# Patient Record
Sex: FEMALE | Race: ASIAN | NOT HISPANIC OR LATINO | URBAN - METROPOLITAN AREA
[De-identification: names, ages, dates, MRNs, and addresses within clinical notes are randomized per-mention and may not be internally consistent; named-entity substitution may affect disease eponyms.]

---

## 2020-03-04 ENCOUNTER — EMERGENCY (EMERGENCY)
Facility: HOSPITAL | Age: 65
LOS: 1 days | Discharge: HOME | End: 2020-03-04
Attending: EMERGENCY MEDICINE
Payer: COMMERCIAL

## 2020-03-04 ENCOUNTER — APPOINTMENT (OUTPATIENT)
Dept: PLASTIC SURGERY | Facility: CLINIC | Age: 65
End: 2020-03-04
Payer: COMMERCIAL

## 2020-03-04 VITALS
TEMPERATURE: 98 F | HEART RATE: 83 BPM | WEIGHT: 125 LBS | RESPIRATION RATE: 18 BRPM | OXYGEN SATURATION: 97 % | DIASTOLIC BLOOD PRESSURE: 92 MMHG | HEIGHT: 63 IN | SYSTOLIC BLOOD PRESSURE: 160 MMHG

## 2020-03-04 VITALS
WEIGHT: 130.07 LBS | OXYGEN SATURATION: 97 % | SYSTOLIC BLOOD PRESSURE: 138 MMHG | RESPIRATION RATE: 17 BRPM | HEART RATE: 82 BPM | TEMPERATURE: 98 F | HEIGHT: 63 IN | DIASTOLIC BLOOD PRESSURE: 87 MMHG

## 2020-03-04 VITALS
TEMPERATURE: 98 F | HEART RATE: 76 BPM | SYSTOLIC BLOOD PRESSURE: 162 MMHG | DIASTOLIC BLOOD PRESSURE: 89 MMHG | RESPIRATION RATE: 18 BRPM | OXYGEN SATURATION: 97 %

## 2020-03-04 VITALS
HEART RATE: 85 BPM | SYSTOLIC BLOOD PRESSURE: 140 MMHG | OXYGEN SATURATION: 85 % | DIASTOLIC BLOOD PRESSURE: 86 MMHG | WEIGHT: 134 LBS | HEIGHT: 63 IN | BODY MASS INDEX: 23.74 KG/M2

## 2020-03-04 LAB
ALBUMIN SERPL ELPH-MCNC: 4.5 G/DL — SIGNIFICANT CHANGE UP (ref 3.3–5)
ALP SERPL-CCNC: 48 U/L — SIGNIFICANT CHANGE UP (ref 40–120)
ALT FLD-CCNC: 18 U/L — SIGNIFICANT CHANGE UP (ref 10–45)
ANION GAP SERPL CALC-SCNC: 15 MMOL/L — SIGNIFICANT CHANGE UP (ref 5–17)
APTT BLD: 33.9 SEC — SIGNIFICANT CHANGE UP (ref 27.5–36.3)
AST SERPL-CCNC: 23 U/L — SIGNIFICANT CHANGE UP (ref 10–40)
BILIRUB SERPL-MCNC: 0.5 MG/DL — SIGNIFICANT CHANGE UP (ref 0.2–1.2)
BUN SERPL-MCNC: 20 MG/DL — SIGNIFICANT CHANGE UP (ref 7–23)
CALCIUM SERPL-MCNC: 10.2 MG/DL — SIGNIFICANT CHANGE UP (ref 8.4–10.5)
CHLORIDE SERPL-SCNC: 106 MMOL/L — SIGNIFICANT CHANGE UP (ref 96–108)
CO2 SERPL-SCNC: 22 MMOL/L — SIGNIFICANT CHANGE UP (ref 22–31)
CREAT SERPL-MCNC: 0.88 MG/DL — SIGNIFICANT CHANGE UP (ref 0.5–1.3)
GLUCOSE SERPL-MCNC: 105 MG/DL — HIGH (ref 70–99)
HCT VFR BLD CALC: 44.9 % — SIGNIFICANT CHANGE UP (ref 34.5–45)
HGB BLD-MCNC: 14.5 G/DL — SIGNIFICANT CHANGE UP (ref 11.5–15.5)
INR BLD: 0.92 RATIO — SIGNIFICANT CHANGE UP (ref 0.88–1.16)
MCHC RBC-ENTMCNC: 28.3 PG — SIGNIFICANT CHANGE UP (ref 27–34)
MCHC RBC-ENTMCNC: 32.3 GM/DL — SIGNIFICANT CHANGE UP (ref 32–36)
MCV RBC AUTO: 87.5 FL — SIGNIFICANT CHANGE UP (ref 80–100)
NRBC # BLD: 0 /100 WBCS — SIGNIFICANT CHANGE UP (ref 0–0)
PLATELET # BLD AUTO: 298 K/UL — SIGNIFICANT CHANGE UP (ref 150–400)
POTASSIUM SERPL-MCNC: 4.1 MMOL/L — SIGNIFICANT CHANGE UP (ref 3.5–5.3)
POTASSIUM SERPL-SCNC: 4.1 MMOL/L — SIGNIFICANT CHANGE UP (ref 3.5–5.3)
PROT SERPL-MCNC: 7.2 G/DL — SIGNIFICANT CHANGE UP (ref 6–8.3)
PROTHROM AB SERPL-ACNC: 10.6 SEC — SIGNIFICANT CHANGE UP (ref 10–12.9)
RBC # BLD: 5.13 M/UL — SIGNIFICANT CHANGE UP (ref 3.8–5.2)
RBC # FLD: 13.1 % — SIGNIFICANT CHANGE UP (ref 10.3–14.5)
SODIUM SERPL-SCNC: 143 MMOL/L — SIGNIFICANT CHANGE UP (ref 135–145)
WBC # BLD: 9.42 K/UL — SIGNIFICANT CHANGE UP (ref 3.8–10.5)
WBC # FLD AUTO: 9.42 K/UL — SIGNIFICANT CHANGE UP (ref 3.8–10.5)

## 2020-03-04 PROCEDURE — 76377 3D RENDER W/INTRP POSTPROCES: CPT | Mod: 26

## 2020-03-04 PROCEDURE — 85610 PROTHROMBIN TIME: CPT

## 2020-03-04 PROCEDURE — 73502 X-RAY EXAM HIP UNI 2-3 VIEWS: CPT

## 2020-03-04 PROCEDURE — 99285 EMERGENCY DEPT VISIT HI MDM: CPT

## 2020-03-04 PROCEDURE — 71045 X-RAY EXAM CHEST 1 VIEW: CPT

## 2020-03-04 PROCEDURE — 71045 X-RAY EXAM CHEST 1 VIEW: CPT | Mod: 26

## 2020-03-04 PROCEDURE — 70486 CT MAXILLOFACIAL W/O DYE: CPT | Mod: 26,77

## 2020-03-04 PROCEDURE — 73502 X-RAY EXAM HIP UNI 2-3 VIEWS: CPT | Mod: 26,LT

## 2020-03-04 PROCEDURE — 90471 IMMUNIZATION ADMIN: CPT

## 2020-03-04 PROCEDURE — 70450 CT HEAD/BRAIN W/O DYE: CPT | Mod: 26

## 2020-03-04 PROCEDURE — 73562 X-RAY EXAM OF KNEE 3: CPT | Mod: 26,LT

## 2020-03-04 PROCEDURE — 73562 X-RAY EXAM OF KNEE 3: CPT

## 2020-03-04 PROCEDURE — 70486 CT MAXILLOFACIAL W/O DYE: CPT | Mod: 26

## 2020-03-04 PROCEDURE — 76376 3D RENDER W/INTRP POSTPROCES: CPT

## 2020-03-04 PROCEDURE — 99204 OFFICE O/P NEW MOD 45 MIN: CPT

## 2020-03-04 PROCEDURE — 99284 EMERGENCY DEPT VISIT MOD MDM: CPT | Mod: 25

## 2020-03-04 PROCEDURE — 76512 OPH US DX B-SCAN: CPT

## 2020-03-04 PROCEDURE — 90715 TDAP VACCINE 7 YRS/> IM: CPT

## 2020-03-04 PROCEDURE — 76512 OPH US DX B-SCAN: CPT | Mod: 26,LT

## 2020-03-04 PROCEDURE — 76377 3D RENDER W/INTRP POSTPROCES: CPT

## 2020-03-04 PROCEDURE — 70486 CT MAXILLOFACIAL W/O DYE: CPT

## 2020-03-04 PROCEDURE — 85027 COMPLETE CBC AUTOMATED: CPT

## 2020-03-04 PROCEDURE — 70450 CT HEAD/BRAIN W/O DYE: CPT

## 2020-03-04 PROCEDURE — 80053 COMPREHEN METABOLIC PANEL: CPT

## 2020-03-04 PROCEDURE — 85730 THROMBOPLASTIN TIME PARTIAL: CPT

## 2020-03-04 RX ORDER — ACETAMINOPHEN 500 MG
650 TABLET ORAL ONCE
Refills: 0 | Status: COMPLETED | OUTPATIENT
Start: 2020-03-04 | End: 2020-03-04

## 2020-03-04 RX ORDER — TETANUS TOXOID, REDUCED DIPHTHERIA TOXOID AND ACELLULAR PERTUSSIS VACCINE, ADSORBED 5; 2.5; 8; 8; 2.5 [IU]/.5ML; [IU]/.5ML; UG/.5ML; UG/.5ML; UG/.5ML
0.5 SUSPENSION INTRAMUSCULAR ONCE
Refills: 0 | Status: COMPLETED | OUTPATIENT
Start: 2020-03-04 | End: 2020-03-04

## 2020-03-04 RX ADMIN — Medication 650 MILLIGRAM(S): at 12:21

## 2020-03-04 RX ADMIN — TETANUS TOXOID, REDUCED DIPHTHERIA TOXOID AND ACELLULAR PERTUSSIS VACCINE, ADSORBED 0.5 MILLILITER(S): 5; 2.5; 8; 8; 2.5 SUSPENSION INTRAMUSCULAR at 12:21

## 2020-03-04 NOTE — ED PROVIDER NOTE - ATTENDING CONTRIBUTION TO CARE
MD Rosy -- I have reviewed this note, the presenting symptoms, and the Chief Complaint and the History of Present Illness as documented, with the other care provider(s) and nurses on the patient care team. I have also reviewed this patient's past medical/surgical history and social/family history as reviewed and listed in this electronic medical record and agree with the above except as noted below:     HPI --  PMH --  PSH --  Allergies --   ROS --  PE -- HEENT -- L sided signifiacnt periorbital swelling and ecchymosis; able to open globe and visualize L sided subconj hematoma not involving anterior chamber, no hyphema or coloboma, no hypopyon.  Vision -- reports able to see light and movement at best.  OS IOP 12, 13, 15, 17, 14.  No johnathan.  Appears to have EOMI and PERRLA b/l.  +periorbital tender to palpation but no obvious stepoff or paresthesia.     [facility] -- MD Rosy -- I have reviewed this note, the presenting symptoms, and the Chief Complaint and the History of Present Illness as documented, with the other care provider(s) and nurses on the patient care team. I have also reviewed this patient's past medical/surgical history and social/family history as reviewed and listed in this electronic medical record and agree with the above except as noted below:     HPI -- blunt trauma in 64y F already d/c after CTs found to be WNL.  Now c worsening vision on L, uncertain of time course as was d/c c eye patch, states after taking patch off tonight her vision is worse than when she left ED today.  No new trauma.  +worsening pain but has not taken Rx for pain.  +worsening swelling.  Not on AC.  No other complaints.    PMH -- reviewed  PSH -- reviewed  Allergies -- NKDA  ROS -- vision loss, facial pain, Otherwise negative except as per HPI.   PE -- HEENT -- L sided signifiacnt periorbital swelling and ecchymosis; able to open globe and visualize L sided subconj hematoma not involving anterior chamber, no hyphema or coloboma, no hypopyon.  Vision -- reports able to see light and movement at best.  OS IOP 12, 13, 15, 17, 14.  Uncorrected OD 20/50, OS 20/70.  No johnathan.  OS grossly EOMI and PERRLA but EOM exam somewhat limited 2/2 soft tissue swelling.  No proptosis.  +periorbital tender to palpation but no obvious stepoff or paresthesia.   No facial hypoesthesias.  +L maxillary ecchymosis/hematoma with underlying tenderness to palpation but no stepoff.  no jugular venous distension, supple neck, PERRLA  GENERAL: AAOx4, GCS 15, NAD, WDWN; HEENT: MMM, , EOMI, nonicteric sclera; PULM: CTA B, no crackles/rubs/rales; CV: RRR, S1S2, no MRG; ABD: Flat abdomen, NTND, no R/G/R, no CVAT.  MSK: BAILEY, +2 pulses x4;  NEURO: No obvious focal deficits; PSYCH: AAOx3, clear thought and normal sensorium.   MDM -- L facial pain and worsening vision loss after blunt trauma ~9am today.  Already seen in ED and s/p CTs.  States vision on L is worsening.  No obvious s/s of acute an acute angle glaucoma, no proptosis, no e/o open globe, IOP <20 on multiple reexaminations.  Vision is worse on L but (uncorrected) 20/70 v 20/50.  Needs rpt CT max face to eval for retroorbital hematoma, ophtho c/s, pain rx, labs, reassess.

## 2020-03-04 NOTE — ED PROVIDER NOTE - CONSTITUTIONAL, MLM
normal... Well appearing, awake, alert, oriented to person, place, time/situation, significant L eye swelling.

## 2020-03-04 NOTE — ED PROVIDER NOTE - NSFOLLOWUPINSTRUCTIONS_ED_ALL_ED_FT
Followup with primary care provider for further care. Come back to ED for any increase pain, headaches, nausea, vomiting, or any other change of symptoms. Keep Ice on Eye contusion for symptomatic relief and decrease of swelling. Tylenol or motrin for pain if needed.

## 2020-03-04 NOTE — ED ADULT NURSE NOTE - OBJECTIVE STATEMENT
64 year old female presented to ED with c/o of trip and fall over brick in backyard, hitting left eye on cement. pt reports losing consciousness for 3 seconds. hx fibromyalgia and HTN, took 1 aspirin today for pain because of fibromyalgia. pt reports mild 5/10 headache due to fall. pt denies CP, SOB, nausea/vomiting, numbness/tingling, fever, cough, chills, dizziness, blurred vision, neuro intact. pt a&ox3, lung sounds clear, heart rate regular, abdomen soft nontender nondistended to palp. left eye swollen shut, bruised, skin tear noted under eye and on bridge of nose, no active bleeding. Will continue to monitor and assess while offering support and reassurance.

## 2020-03-04 NOTE — CONSULT NOTE ADULT - SUBJECTIVE AND OBJECTIVE BOX
[facility] DEPARTMENT OF OPHTHALMOLOGY - INITIAL ADULT CONSULT  -----------------------------------------------------------------------------------------------------------------  Ella Suyeon Yu, MD PGY 2  Pager: [phone]  -----------------------------------------------------------------------------------------------------------------    HPI:65 yo s/p mechanical fall, tripped and fell on concrete. was wearing glasses, states they were polycarbonate glasses that didn't shatter and thinks that's what broke the fall. Has OS periorbital swelling, unknown when asked if she has blurry vision due to inability to open eye. No other symptoms.     PAST MEDICAL & SURGICAL HISTORY:  HTN    No significant past surgical history    Past Ocular History: None, no glc/AMD, Surgery, lasers    FAMILY HISTORY:  No pertinent family history in first degree relatives    Social History: None    Ophthalmic Medications: None    MEDICATIONS  (STANDING):    MEDICATIONS  (PRN):    Allergies & Intolerances:   mycins (Unknown)    Review of Systems:  Constitutional: No fever, chills  Eyes: No blurry vision, flashes, floaters, FBS, erythema, discharge, double vision, OU  Neuro: No tremors  Cardiovascular: No chest pain, palpitations  Respiratory: No SOB, no cough  GI: No nausea, vomiting, abdominal pain  : No dysuria  Skin: no rash  Psych: no depression  Endocrine: no polyuria, polydipsia  Heme/lymph: no swelling    VITALS: T(C): 36.4 (03-04-20 @ 18:23)  T(F): 97.6 (03-04-20 @ 18:23), Max: 98.1 (03-04-20 @ 10:20)  HR: 82 (03-04-20 @ 18:23) (76 - 83)  BP: 138/87 (03-04-20 @ 18:23) (138/87 - 162/89)  RR:  (17 - 18)  SpO2:  (97% - 97%)  Wt(kg): --  General: AAO x 3, appropriate mood and affect    Ophthalmology Exam:  Visual acuity (sc): 20/50 OD, 20/70 OS  Pupils: PERRL OU, no APD  Ttono: 10,14 OU  Extraocular movements (EOMs): Grossly Full OU, no pain, no diplopia  Confrontational Visual Field (CVF): Full OD, Unable OS due to inability to keep eyes open, can open about 1mm   Color Plates: 12/12 OU    SLE  External: periorbital ecchymosis,eyes shut  Lids/Lashes/Lacrimal Ducts: Periorbital ecchymosis, eyes shut, tight swelling lower lids, nasal laceration and inferior lid laceration, superficial  Sclera/Conjunctiva: W+Q OD, subconj heme temporally, nasally and inferiorly OS  Cornea: Cl OD, diffuse SPK and 3 d-folds OS  Anterior Chamber: D+Q OU    Iris: Flat OU  Lens: 2+NS and tr PSC OU    Fundus Exam: dilated with 1% tropicamide and 2.5% phenylephrine  Approval obtained from primary team for dilation  Patient aware that pupils can remained dilated for at least 4-6 hours  Exam performed with 20D lens    Vitreous: wnl OU  Disc, cup/disc: sharp and pink, 0.2 OU  Macula: wnl OU  Vessels: wnl OU  Periphery: wnl OU    Labs/Imaging:  < from: CT Maxillofacial No Cont (03.04.20 @ 20:00) >  Redemonstration of large left periorbital and premaxillary soft tissue hematoma.    No gross CT evidence of traumatic globe rupture, retrobulbar hematoma or proptosis.    Trace right mastoid effusion.    < end of copied text >  ***

## 2020-03-04 NOTE — ED PROVIDER NOTE - CLINICAL SUMMARY MEDICAL DECISION MAKING FREE TEXT BOX
Attending note. Trip and fall with facial injury. CT of head and maxillofacial CT to rule out fractures and intracranial hemorrhage. Tetanus prophylaxis. Analgesia. Chest x-ray.

## 2020-03-04 NOTE — ED PROVIDER NOTE - OBJECTIVE STATEMENT
Attending note. Patient tripped and fell at home falling on her face. Patient complains of severe headache, nausea, left facial pain with swelling about the eye and bleeding from the nose. Patient complains of pain in the shoulders. She denies any neck or back pain. Carlos has pain in her left knee and left upper anterior chest wall. She denies any loss of consciousness or vomiting. She has no numbness or paresthesia. She denies any shortness of breath or abdominal pain. Patient is a pharmacist. She takes no anticoagulants however she did take aspirin after the injury. She does not recall her last tetanus shot.

## 2020-03-04 NOTE — ED ADULT NURSE NOTE - OBJECTIVE STATEMENT
63 yo F aaox4 C/o of left eye pain s/p injury. Pt recently discharged from ED recently and seen by plastics surgery. Pain getting progressively worst. as per pt   report. Provider at bedside. 63 yo F aaox4 ambulatory C/o of left eye pain s/p injury. Pt recently discharged from ED today and seen by plastics surgery. Pain getting progressively worst. as per pt report. Provider at bedside.

## 2020-03-04 NOTE — CONSULT NOTE ADULT - ASSESSMENT
Assessment and Recommendations:  64y female w/ pmhx/ochx of fibromyalgia and HTN consulted for periorbital swelling, found to have periorbital swelling and subconj hemmorhage    - Ice packs to face for next 1-2days  - pt to go to plastic for lac repair, states she's already scheduled for tomorrow  - Artificial tears 6x a day OS   - no evidence of orbital fractures on CT x2, and no evidence of fractures on clinical exam  - f/u on monday in general clinic at address below    Outpatient follow-up: Patient should follow-up with his/her ophthalmologist or with [facility] Department of Ophthalmology within 1 week of after discharge at:    [address]  [place] [address]  [phone]

## 2020-03-04 NOTE — ED PROVIDER NOTE - NSFOLLOWUPCLINICS_GEN_ALL_ED_FT
[facility] - Ophthalmology  Ophthalmology  [address], [address]  [place] [address]  Phone: [phone]  Fax:   Follow Up Time: 4-6 Days

## 2020-03-04 NOTE — ED PROVIDER NOTE - OBJECTIVE STATEMENT
64 year old female presents with increased pain/swelling and decreased vision in Left eye s/p trip and fall early today. Patient was recently seen in ED and discharged after normal CTs, ambulatory,  and represented with increased pain to Left eye. No further trauma. No headache, dizziness, or neck pain.

## 2020-03-04 NOTE — ED PROVIDER NOTE - CARE PLAN
Principal Discharge DX:	Eye contusion, left, initial encounter  Secondary Diagnosis:	Contusion of left knee, initial encounter

## 2020-03-04 NOTE — ED PROVIDER NOTE - PATIENT PORTAL LINK FT
You can access the FollowMyHealth Patient Portal offered by [facility] by registering at the following website: [email or web address]. By joining [employer]’s FollowMyHealth portal, you will also be able to view your health information using other applications (apps) compatible with our system.

## 2020-03-04 NOTE — ED PROVIDER NOTE - NSFOLLOWUPINSTRUCTIONS_ED_ALL_ED_FT
Follow up with the [facility] on Monday.  The phone number is on this sheet.    Artificial tears 1-2 drops four times    Tylenol 500 mg (1 extra strength pill) every 6 hours as needed for pain    Return to the ER for severe pain, vomiting, vision loss, or ANY other concerns

## 2020-03-04 NOTE — ED ADULT NURSE REASSESSMENT NOTE - NS ED NURSE REASSESS COMMENT FT1
opthalmology reports no damage to eye, tissue swelling around eye due to fall. reports pt should follow up with opthalmology tomorrow

## 2020-03-04 NOTE — ED PROVIDER NOTE - CLINICAL SUMMARY MEDICAL DECISION MAKING FREE TEXT BOX
see attd statement.  --BMM see attd statement.  --BMM    CT negative for retrobulbar hematoma, similar visual acuity without her glasses, bedside US unremarkable  Contacted Ophthalmology for consult, patient remains stable - ML

## 2020-03-04 NOTE — ED ADULT NURSE NOTE - NSIMPLEMENTINTERV_GEN_ALL_ED
Implemented All Universal Safety Interventions:  [place] to call system. Call bell, personal items and telephone within reach. Instruct patient to call for assistance. Room bathroom lighting operational. Non-slip footwear when patient is off stretcher. Physically safe environment: no spills, clutter or unnecessary equipment. Stretcher in lowest position, wheels locked, appropriate side rails in place.

## 2020-03-04 NOTE — ED PROVIDER NOTE - PHYSICAL EXAMINATION
Attending note. Patient is alert and in moderate distress. Patient has significant tenderness swelling and bruising around the left orbit with swelling of the eyelids. There is no subconjunctival hemorrhage. Extraocular muscles are intact without diplopia. There is an abrasion over the bridge of the nose, left cheek and left infraorbital area. TMJ mandible and nontender. Patient has tenderness in the trapezius muscles bilaterally. There is no midline cervical, thoracic or lumbar tenderness. There is slight tenderness in the left upper anterior chest wall without crepitance. Lungs are clear and equal bilaterally. There is no splinting with deep inspiration. Abdomen soft and nontender. Patient is moving all extremities with full range of motion. Patient has slight tenderness in the anterior left knee. There is no knee swelling, deformity or her knee effusion. Neurologic examination is intact and nonfocal.

## 2020-03-04 NOTE — ED ADULT TRIAGE NOTE - SOURCE OF INFORMATION
Patient accompanied by mom today.  Patient gave verbal consent to discuss all medical concerns including mental health and weight while mom in room.       Patient

## 2020-03-04 NOTE — ED PROVIDER NOTE - PROGRESS NOTE DETAILS
Ambulating without difficulty, Imaging negative. Feels well. Significant swelling over L eye supportive care with ice. Precautions given. Will clean wounds, eval for lac repair, may be skin tears. will be discharge with PMD followup.

## 2020-03-04 NOTE — ED PROVIDER NOTE - ATTENDING CONTRIBUTION TO CARE
I performed a history and physical exam of the patient and discussed their management with the resident/ACP/medical or PA student. I reviewed the resident/ACP/medical or PA student's note and agree with the documented findings and plan of care.  attn -see MDM

## 2020-03-05 ENCOUNTER — APPOINTMENT (OUTPATIENT)
Dept: PLASTIC SURGERY | Facility: CLINIC | Age: 65
End: 2020-03-05
Payer: COMMERCIAL

## 2020-03-05 DIAGNOSIS — Z91.81 HISTORY OF FALLING: ICD-10-CM

## 2020-03-05 DIAGNOSIS — S01.81XS LACERATION W/OUT FOREIGN BODY OF OTHER PART OF HEAD, SEQUELA: ICD-10-CM

## 2020-03-05 PROCEDURE — 11042 DBRDMT SUBQ TIS 1ST 20SQCM/<: CPT | Mod: 59

## 2020-03-05 PROCEDURE — 12052 INTMD RPR FACE/MM 2.6-5.0 CM: CPT

## 2020-03-12 ENCOUNTER — APPOINTMENT (OUTPATIENT)
Dept: PLASTIC SURGERY | Facility: CLINIC | Age: 65
End: 2020-03-12
Payer: COMMERCIAL

## 2020-03-12 ENCOUNTER — APPOINTMENT (OUTPATIENT)
Dept: OPHTHALMOLOGY | Facility: CLINIC | Age: 65
End: 2020-03-12

## 2020-03-12 PROCEDURE — 99024 POSTOP FOLLOW-UP VISIT: CPT

## 2020-03-26 ENCOUNTER — APPOINTMENT (OUTPATIENT)
Dept: PLASTIC SURGERY | Facility: CLINIC | Age: 65
End: 2020-03-26

## 2020-04-02 PROBLEM — Z91.81 STATUS POST FALL: Status: ACTIVE | Noted: 2020-03-11

## 2020-04-02 PROBLEM — S01.81XS LACERATION OF FACE, SEQUELA: Status: ACTIVE | Noted: 2020-03-11

## 2020-04-02 NOTE — REASON FOR VISIT
[Procedure: _________] : a [unfilled] procedure visit [FreeTextEntry1] : Pt presents in office today for procedure of revision of facial scar

## 2020-04-02 NOTE — PROCEDURE
[Nl] : None [FreeTextEntry1] : Facial laceration [FreeTextEntry2] : Revision of facial lacerations -nose 2.5 cm and cheek 1.5cm [FreeTextEntry6] : After the area was prepped and draped, the area was infiltrated with 1%lidocaine with epi. The previous lacerations on the nose and left cheek were sharply debrided. The wounds were then irrigated. \par \par Next undermining was performed to allow a tensionless closure. The lacerations were then closed in layers  with 4-0 mononcryl for the dermis and 5-0 nylon for the skin. The total length wa 2.5cm and 1.5cm. A dressing was applied. Pt tolerated well procedure.\par

## 2021-08-30 ENCOUNTER — APPOINTMENT (OUTPATIENT)
Dept: GASTROENTEROLOGY | Facility: CLINIC | Age: 66
End: 2021-08-30

## 2021-09-03 ENCOUNTER — APPOINTMENT (OUTPATIENT)
Dept: GASTROENTEROLOGY | Facility: CLINIC | Age: 66
End: 2021-09-03

## 2021-09-24 ENCOUNTER — APPOINTMENT (OUTPATIENT)
Dept: GASTROENTEROLOGY | Facility: CLINIC | Age: 66
End: 2021-09-24

## 2021-12-09 NOTE — ED PROVIDER NOTE - PATIENT PORTAL LINK FT
No You can access the FollowMyHealth Patient Portal offered by [facility] by registering at the following website: [email or web address]. By joining [employer]’s FollowMyHealth portal, you will also be able to view your health information using other applications (apps) compatible with our system.

## 2022-02-02 ENCOUNTER — APPOINTMENT (OUTPATIENT)
Dept: GASTROENTEROLOGY | Facility: CLINIC | Age: 67
End: 2022-02-02
Payer: COMMERCIAL

## 2022-02-02 VITALS — BODY MASS INDEX: 24.27 KG/M2 | WEIGHT: 137 LBS | HEART RATE: 78 BPM | HEIGHT: 63 IN | RESPIRATION RATE: 12 BRPM

## 2022-02-02 DIAGNOSIS — M79.7 FIBROMYALGIA: ICD-10-CM

## 2022-02-02 DIAGNOSIS — Z12.11 ENCOUNTER FOR SCREENING FOR MALIGNANT NEOPLASM OF COLON: ICD-10-CM

## 2022-02-02 DIAGNOSIS — Z87.39 PERSONAL HISTORY OF OTHER DISEASES OF THE MUSCULOSKELETAL SYSTEM AND CONNECTIVE TISSUE: ICD-10-CM

## 2022-02-02 PROCEDURE — 99204 OFFICE O/P NEW MOD 45 MIN: CPT | Mod: 25

## 2022-02-02 PROCEDURE — 36415 COLL VENOUS BLD VENIPUNCTURE: CPT

## 2022-02-02 RX ORDER — METOPROLOL SUCCINATE 100 MG/1
TABLET, EXTENDED RELEASE ORAL
Refills: 0 | Status: ACTIVE | COMMUNITY

## 2022-02-02 RX ORDER — PREGABALIN 330 MG/1
TABLET, FILM COATED, EXTENDED RELEASE ORAL
Refills: 0 | Status: ACTIVE | COMMUNITY

## 2022-02-02 NOTE — CONSULT LETTER
[Dear  ___] : Dear  [unfilled], [Consult Letter:] : I had the pleasure of evaluating your patient, [unfilled]. [Please see my note below.] : Please see my note below. [Consult Closing:] : Thank you very much for allowing me to participate in the care of this patient.  If you have any questions, please do not hesitate to contact me. [Sincerely,] : Sincerely, [FreeTextEntry3] : Mauricio Graham MD, FACP, AGAF, FAASLD\par  of Medicine\par [facility]\par

## 2022-02-02 NOTE — REASON FOR VISIT
[Initial Evaluation] : an initial evaluation [FreeTextEntry1] : second opinion for likely IPMN of pancreas;colon cancer screening

## 2022-02-02 NOTE — ASSESSMENT
[FreeTextEntry1] : 58-year-old female with a paternal history of pancreatic cancer at age 82 self-referred for second opinion with multiple IPMN on MRI of the pancreas the largest measuring approximate 9 mm.  There is no weight loss or anorexia.  She is never undergone colorectal cancer screening.  We discussed the generally benign nature of IPMN, but possible progression to pancreatic adenoca. Additionally, I adivsed her to undergo colonoscopy.  Given her family hx, will refer to Beatriz Carter MD at [facility] for evaluation with possible EUS\par \par Plan:\par 1. cbc, cmp, cea, ca 19-9, HBV, HCV\par 2. She was advised to undergo colonoscopy to which she  agreed. The procedure will be performed in [facility] with the assistance of an anesthesiologist. The procedure was explained in detail and she understood the risks of the procedure not limited  to infection, bleeding, perforation, or non-diagnosis of colon cancer. She  was advised that she could not drive home alone, if the patient chooses to receive sedation. Further diagnostic and treatment recommendations will be based upon the procedure and any biopsies, if they are taken.\par \par 3. Referral to Beatriz Carter MD  for evaluation.

## 2022-02-03 ENCOUNTER — NON-APPOINTMENT (OUTPATIENT)
Age: 67
End: 2022-02-03

## 2022-02-03 LAB
ALBUMIN SERPL ELPH-MCNC: 4.7 G/DL
ALP BLD-CCNC: 88 U/L
ALT SERPL-CCNC: 25 U/L
ANION GAP SERPL CALC-SCNC: 17 MMOL/L
AST SERPL-CCNC: 27 U/L
BASOPHILS # BLD AUTO: 0.09 K/UL
BASOPHILS NFR BLD AUTO: 1.5 %
BILIRUB SERPL-MCNC: 0.3 MG/DL
BUN SERPL-MCNC: 21 MG/DL
CALCIUM SERPL-MCNC: 9.6 MG/DL
CANCER AG19-9 SERPL-ACNC: 16 U/ML
CEA SERPL-MCNC: 4 NG/ML
CHLORIDE SERPL-SCNC: 104 MMOL/L
CO2 SERPL-SCNC: 21 MMOL/L
CREAT SERPL-MCNC: 0.98 MG/DL
EOSINOPHIL # BLD AUTO: 0.19 K/UL
EOSINOPHIL NFR BLD AUTO: 3.1 %
GLUCOSE SERPL-MCNC: 110 MG/DL
HCT VFR BLD CALC: 44.8 %
HGB BLD-MCNC: 14.3 G/DL
IMM GRANULOCYTES NFR BLD AUTO: 0.3 %
LYMPHOCYTES # BLD AUTO: 2.24 K/UL
LYMPHOCYTES NFR BLD AUTO: 36.2 %
MAN DIFF?: NORMAL
MCHC RBC-ENTMCNC: 28.8 PG
MCHC RBC-ENTMCNC: 31.9 GM/DL
MCV RBC AUTO: 90.3 FL
MONOCYTES # BLD AUTO: 0.75 K/UL
MONOCYTES NFR BLD AUTO: 12.1 %
NEUTROPHILS # BLD AUTO: 2.89 K/UL
NEUTROPHILS NFR BLD AUTO: 46.8 %
PLATELET # BLD AUTO: 320 K/UL
POTASSIUM SERPL-SCNC: 4.2 MMOL/L
PROT SERPL-MCNC: 7 G/DL
RBC # BLD: 4.96 M/UL
RBC # FLD: 13.4 %
SODIUM SERPL-SCNC: 142 MMOL/L
WBC # FLD AUTO: 6.18 K/UL

## 2022-02-07 LAB
ALPHA-1-FETOPROTEIN-L3: NORMAL %
ALPHA-1-FETOPROTEIN: 0.9 NG/ML

## 2022-02-09 ENCOUNTER — APPOINTMENT (OUTPATIENT)
Dept: GASTROENTEROLOGY | Facility: CLINIC | Age: 67
End: 2022-02-09
Payer: COMMERCIAL

## 2022-02-09 DIAGNOSIS — D49.0 NEOPLASM OF UNSPECIFIED BEHAVIOR OF DIGESTIVE SYSTEM: ICD-10-CM

## 2022-02-09 PROCEDURE — 99205 OFFICE O/P NEW HI 60 MIN: CPT

## 2022-02-09 NOTE — HISTORY OF PRESENT ILLNESS
[de-identified] : YASMINE GAR is a 66 year old female with a history of HTN, fibromyalgia, HLD\par \par She is being referred today by Dr. Graham for evaluation of pancreatic cysts and high risk pancreatic cancer screening.\par \par She has a paternal history of pancreatic cancer diagnosed around age 80. No other family members with panc cancer that she knows of.  No known cancer syndromes or genetic syndromes.\par \par She underwent an MRI for panc cancer suveillance in late Sept 2021 and she was found to have multiple small (7-9mm) pancreatic cysts without high risk features or main duct dilation.  She is nervous given father's history.\par \par She has no personal history of diabetes.  No A1c in the system.  No personal or family history of pancreatitis or other pancreatic diseases.  No abd pain, fevers, chills, weight loss, etc.\par \par \par \par \par

## 2022-02-09 NOTE — ASSESSMENT
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- no high risk features, first diagnosis\par #FamHx PDAC (father at age 80)\par \par Recs:\par - discussed natural history of IPMN and very small but possible risk of progression to PDAC and thus need for surveillance\par - discussed having 1 FDR with PDAC does not put her in high-risk category by NCCN guidelines however do understand concern for possible increased risk \par - patient would like EUS to be next imaging study, will plan for 6 months EUS and then enrollment in cyst surveillance program\par - will need to get A1c for baseline at some point\par - Benefits and risks of the procedure (including but not limited to pain, infection, bleeding, perforation, injury to internal organs, missed lesions, IV site problems, risks of anesthesia, possible need for further procedures including emergency surgery) were explained to the patient. Alternatives to the procedure were discussed. The patient was agreeable to proceed.\par - will not need PSTs

## 2022-02-09 NOTE — PHYSICAL EXAM
[General Appearance - Alert] : alert [General Appearance - In No Acute Distress] : in no acute distress [Sclera] : the sclera and conjunctiva were normal [Outer Ear] : the ears and nose were normal in appearance [Neck Appearance] : the appearance of the neck was normal [Auscultation Breath Sounds / Voice Sounds] : lungs were clear to auscultation bilaterally [Heart Rate And Rhythm] : heart rate was normal and rhythm regular [Heart Sounds] : normal S1 and S2 [Heart Sounds Gallop] : no gallops [Murmurs] : no murmurs [Heart Sounds Pericardial Friction Rub] : no pericardial rub [Breast Appearance] : normal in appearance [Breast Palpation Mass] : no palpable masses [Bowel Sounds] : normal bowel sounds [Abdomen Soft] : soft [Abdomen Tenderness] : non-tender [] : no hepato-splenomegaly [Abdomen Mass (___ Cm)] : no abdominal mass palpated [Involuntary Movements] : no involuntary movements were seen [Skin Color & Pigmentation] : normal skin color and pigmentation [Deep Tendon Reflexes (DTR)] : deep tendon reflexes were 2+ and symmetric [Sensation] : the sensory exam was normal to light touch and pinprick [No Focal Deficits] : no focal deficits [Oriented To Time, Place, And Person] : oriented to person, place, and time [Impaired Insight] : insight and judgment were intact [Affect] : the affect was normal

## 2022-02-09 NOTE — CONSULT LETTER
[Dear  ___] : Dear  [unfilled], [Consult Letter:] : I had the pleasure of evaluating your patient, [unfilled]. [Please see my note below.] : Please see my note below. [Consult Closing:] : Thank you very much for allowing me to participate in the care of this patient.  If you have any questions, please do not hesitate to contact me. [Sincerely,] : Sincerely, [FreeTextEntry3] : Beatriz Carter MD

## 2022-02-18 RX ORDER — SODIUM PICOSULFATE, MAGNESIUM OXIDE, AND ANHYDROUS CITRIC ACID 10; 3.5; 12 MG/160ML; G/160ML; G/160ML
10-3.5-12 MG-GM LIQUID ORAL
Qty: 2 | Refills: 0 | Status: ACTIVE | COMMUNITY
Start: 2022-02-18 | End: 1900-01-01

## 2022-09-02 DIAGNOSIS — Z01.812 ENCOUNTER FOR PREPROCEDURAL LABORATORY EXAMINATION: ICD-10-CM

## 2022-10-14 ENCOUNTER — TRANSCRIPTION ENCOUNTER (OUTPATIENT)
Age: 67
End: 2022-10-14

## 2022-10-14 ENCOUNTER — APPOINTMENT (OUTPATIENT)
Dept: GASTROENTEROLOGY | Facility: HOSPITAL | Age: 67
End: 2022-10-14

## 2022-10-14 ENCOUNTER — OUTPATIENT (OUTPATIENT)
Dept: OUTPATIENT SERVICES | Facility: HOSPITAL | Age: 67
LOS: 1 days | End: 2022-10-14
Payer: MEDICARE

## 2022-10-14 ENCOUNTER — RESULT REVIEW (OUTPATIENT)
Age: 67
End: 2022-10-14

## 2022-10-14 VITALS
DIASTOLIC BLOOD PRESSURE: 68 MMHG | OXYGEN SATURATION: 99 % | TEMPERATURE: 98 F | SYSTOLIC BLOOD PRESSURE: 131 MMHG | WEIGHT: 130.07 LBS | RESPIRATION RATE: 20 BRPM | HEART RATE: 71 BPM | HEIGHT: 63 IN

## 2022-10-14 VITALS
SYSTOLIC BLOOD PRESSURE: 119 MMHG | HEART RATE: 64 BPM | RESPIRATION RATE: 18 BRPM | DIASTOLIC BLOOD PRESSURE: 65 MMHG | OXYGEN SATURATION: 98 %

## 2022-10-14 DIAGNOSIS — D49.0 NEOPLASM OF UNSPECIFIED BEHAVIOR OF DIGESTIVE SYSTEM: ICD-10-CM

## 2022-10-14 DIAGNOSIS — Z90.710 ACQUIRED ABSENCE OF BOTH CERVIX AND UTERUS: Chronic | ICD-10-CM

## 2022-10-14 PROCEDURE — 43259 EGD US EXAM DUODENUM/JEJUNUM: CPT

## 2022-10-14 PROCEDURE — 88305 TISSUE EXAM BY PATHOLOGIST: CPT

## 2022-10-14 PROCEDURE — 88341 IMHCHEM/IMCYTCHM EA ADD ANTB: CPT

## 2022-10-14 PROCEDURE — 88342 IMHCHEM/IMCYTCHM 1ST ANTB: CPT | Mod: 26

## 2022-10-14 PROCEDURE — 43239 EGD BIOPSY SINGLE/MULTIPLE: CPT

## 2022-10-14 PROCEDURE — 43259 EGD US EXAM DUODENUM/JEJUNUM: CPT | Mod: GC

## 2022-10-14 PROCEDURE — 43239 EGD BIOPSY SINGLE/MULTIPLE: CPT | Mod: GC

## 2022-10-14 PROCEDURE — 88305 TISSUE EXAM BY PATHOLOGIST: CPT | Mod: 26

## 2022-10-14 RX ORDER — METOPROLOL TARTRATE 50 MG
1 TABLET ORAL
Qty: 0 | Refills: 0 | DISCHARGE

## 2022-10-14 RX ORDER — LIDOCAINE HCL 20 MG/ML
4 VIAL (ML) INJECTION ONCE
Refills: 0 | Status: ACTIVE | OUTPATIENT
Start: 2022-10-14

## 2022-10-14 RX ORDER — SODIUM CHLORIDE 9 MG/ML
500 INJECTION INTRAMUSCULAR; INTRAVENOUS; SUBCUTANEOUS
Refills: 0 | Status: ACTIVE | OUTPATIENT
Start: 2022-10-14

## 2022-10-14 NOTE — PRE PROCEDURE NOTE - PRE PROCEDURE EVALUATION
Pre-Endoscopy Evaluation    Attending Physician:  Dr. Ruben Poon    Procedure: EUS    Indication for Procedure & Pertinent History: See [employer] chart    PAST MEDICAL & SURGICAL HISTORY:  No pertinent past medical history      No significant past surgical history          Allergies    mycins (Unknown)  No Known Drug Allergies    Intolerances        Medications: MEDICATIONS  (STANDING):    MEDICATIONS  (PRN):      Pertinent lab data:                      Physical Examination:  Daily     Daily   Vital Signs Last 24 Hrs  T(C): --  T(F): --  HR: --  BP: --  BP(mean): --  RR: --  SpO2: --      GENERAL: no acute distress  NEURO: alert  HEENT: NCAT, no conjunctival pallor appreciated  CHEST: no respiratory distress, no accessory muscle use  CARDIAC: regular rate, +S1/S2  ABDOMEN: soft, nontender, no rebound or guarding  EXTREMITIES: warm, well perfused  SKIN: no lesions noted    Comments:    ASA Class: I []  II []  III []  IV []    The patient is a suitable candidate for the planned procedure unless box checked [ ]  No, explain:    Note incomplete until finalized by attending signature/attestation.    Devendra Ugarte  GI/Hepatology Fellow    MONDAY-FRIDAY 8AM-5PM:  Pager# [phone] ([facility]) or [phone] ([facility])    NON-URGENT CONSULTS:  Please email [email or web address]  AT NIGHT AND ON WEEKENDS:  Contact on-call GI fellow via answering service ([phone]) from 5pm-8am and on weekends/holidays

## 2022-10-14 NOTE — ASU PATIENT PROFILE, ADULT - PAIN SCALE PREFERRED, PROFILE
1) Your Hemoglobin A1c (3 month test of blood sugar) was 7.9% up from 7.3%. Let me know when you have uploaded more data to Dex[employer] and I'll take a look for any other trends. 2) Your liver and kidney and urine and vitamin D and cholesterol are all normal.    3) Start monitoring blood pressure about 2-3 times per week at alternating times either in the morning or evening after resting for 5 minutes and sitting upright in a chair with your arm at heart level. Please let me know if you are having readings over 140 on the top number or 90 on the bottom number. 4) Your weight is down from 248 in 12/19 to 215 today. Keep up the good work. 5) You won't need labs before the next visit and we'll do the A1c in the office. numerical 0-10

## 2022-10-19 LAB — SURGICAL PATHOLOGY STUDY: SIGNIFICANT CHANGE UP

## 2024-02-08 NOTE — ED ADULT NURSE NOTE - NURSING MUSC ROM
What Type Of Note Output Would You Prefer (Optional)?: Standard Output
Hpi Title: Evaluation of Skin Lesions
How Severe Are Your Spot(S)?: mild
Have Your Spot(S) Been Treated In The Past?: has not been treated
full range of motion in all extremities

## (undated) DEVICE — IRRIGATOR BIO SHIELD

## (undated) DEVICE — TUBING SUCTION CONN 6FT STERILE

## (undated) DEVICE — SENSOR O2 FINGER ADULT

## (undated) DEVICE — FOLEY HOLDER STATLOCK 2 WAY ADULT

## (undated) DEVICE — TUBING IV SET GRAVITY 3Y 100" MACRO

## (undated) DEVICE — SOL INJ NS 0.9% 500ML 2 PORT

## (undated) DEVICE — SUCTION YANKAUER NO CONTROL VENT

## (undated) DEVICE — CATH IV SAFE BC 20G X 1.16" (PINK)

## (undated) DEVICE — BIOPSY FORCEP RADIAL JAW 4 STANDARD WITH NEEDLE

## (undated) DEVICE — BALLOON US ENDO

## (undated) DEVICE — TUBING SUCTION 20FT

## (undated) DEVICE — SYR ALLIANCE II INFLATION 60ML

## (undated) DEVICE — CATH IV SAFE BC 22G X 1" (BLUE)

## (undated) DEVICE — BITE BLOCK ADULT 20 X 27MM (GREEN)

## (undated) DEVICE — FORCEP RADIAL JAW 4 JUMBO 2.8MM 3.2MM 240CM ORANGE DISP

## (undated) DEVICE — BRUSH COLONOSCOPY CYTOLOGY

## (undated) DEVICE — PACK IV START WITH CHG